# Patient Record
Sex: FEMALE | Race: OTHER | HISPANIC OR LATINO | URBAN - METROPOLITAN AREA
[De-identification: names, ages, dates, MRNs, and addresses within clinical notes are randomized per-mention and may not be internally consistent; named-entity substitution may affect disease eponyms.]

---
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
## 2020-10-01 ENCOUNTER — APPOINTMENT (RX ONLY)
Dept: URBAN - METROPOLITAN AREA CLINIC 48 | Facility: CLINIC | Age: 20
Setting detail: DERMATOLOGY
End: 2020-10-01

## 2020-10-01 DIAGNOSIS — Z41.9 ENCOUNTER FOR PROCEDURE FOR PURPOSES OTHER THAN REMEDYING HEALTH STATE, UNSPECIFIED: ICD-10-CM

## 2020-10-01 PROCEDURE — ? LASER HAIR REMOVAL

## 2020-10-01 NOTE — HPI: COSMETIC (LASER HAIR REMOVAL)
Eye Color: Jarad Fleming
Have You Had Laser Hair Removal Before?: has had previous treatment
When Outside In The Sun, Do You...: mildly burns, tans slowly
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
## 2020-11-19 ENCOUNTER — APPOINTMENT (RX ONLY)
Dept: URBAN - METROPOLITAN AREA CLINIC 48 | Facility: CLINIC | Age: 20
Setting detail: DERMATOLOGY
End: 2020-11-19

## 2020-11-19 DIAGNOSIS — Z41.9 ENCOUNTER FOR PROCEDURE FOR PURPOSES OTHER THAN REMEDYING HEALTH STATE, UNSPECIFIED: ICD-10-CM

## 2020-11-19 PROCEDURE — ? LASER HAIR REMOVAL

## 2020-11-19 NOTE — PROCEDURE: LASER HAIR REMOVAL
Number Of Prepaid Treatments (Will Not Render If 0): 8
Render Post-Care In The Note: No
Laser Type: Desire Diode 805nm
Detail Level: Detailed
Tolerated Procedure (Optional): Tolerated Well
Total Pulses: Lucy
Treatment Number: 0
Fluence (Will Not Render If 0): 3[address] [facility]
Were Eye Shields Employed?: Yes
Comments: 100/15 used above lip
Consent: Written consent obtained, risks reviewed including but not limited to crusting, scabbing, blistering, scarring, darker or lighter pigmentary change, paradoxical hair regrowth, incomplete removal of hair and infection.
Post-Procedure Care: Immediate endpoint: perifollicular erythema and edema. Hydrocortisone cream 2.5% applied. Post care reviewed with patient.
Fluence (Will Not Render If 0): [address] [address]
Fluence (Will Not Render If 0): 20
Pre-Procedure: Prior to proceeding the treatment areas were cleaned and all present put on their eye protection.
Pulse Duration: 100 ms
Shaving (Optional): The patient shaved at home
Post-Care Instructions: I reviewed with the patient in detail post-care instructions. Patient should avoid sun for a minimum of 4 weeks before and after treatment.
Treatment Number: 2
Cooling: chill tip
Location Override: 1/2face
Eye Shield Text: Given the treatment area eye shields were inserted prior to treatment.

## 2020-11-19 NOTE — HPI: COSMETIC (LASER HAIR REMOVAL)
Eye Color: Reginaldo Ocasio
Have You Had Laser Hair Removal Before?: has had previous treatment
When Outside In The Sun, Do You...: mildly burns, tans slowly

## 2021-02-11 ENCOUNTER — APPOINTMENT (RX ONLY)
Dept: URBAN - METROPOLITAN AREA CLINIC 48 | Facility: CLINIC | Age: 21
Setting detail: DERMATOLOGY
End: 2021-02-11

## 2021-02-11 DIAGNOSIS — Z41.9 ENCOUNTER FOR PROCEDURE FOR PURPOSES OTHER THAN REMEDYING HEALTH STATE, UNSPECIFIED: ICD-10-CM

## 2021-02-11 PROCEDURE — ? LASER HAIR REMOVAL

## 2021-02-11 NOTE — PROCEDURE: LASER HAIR REMOVAL
Shaving (Optional): The patient shaved at home
External Cooling Fan Speed: 0
3
Fluence (Will Not Render If 0): 48[address] [facility]
Location Override: 1/2 face
Fluence (Will Not Render If 0): 20
Tolerated Procedure (Optional): Tolerated Well
Detail Level: Detailed
Cooling: chill tip
Total Pulses: 3400 [facility]
Consent: Written consent obtained, risks reviewed including but not limited to crusting, scabbing, blistering, scarring, darker or lighter pigmentary change, paradoxical hair regrowth, incomplete removal of hair and infection.
Laser Type: Desire Diode 805nm
Post-Care Instructions: I reviewed with the patient in detail post-care instructions. Patient should avoid sun for a minimum of 4 weeks before and after treatment.
Were Eye Shields Employed?: Yes
Pre-Procedure: Prior to proceeding the treatment areas were cleaned and all present put on their eye protection.
Render Post-Care In The Note: No
Pulse Duration: 100 ms
Eye Shield Text: Given the treatment area eye protective goggles applied prior to treatment.
Fluence (Will Not Render If 0): [address] [facility]
Post-Procedure Care: Immediate endpoint: perifollicular erythema and edema. Hydrocortisone cream 2.5% applied and removed . Post care reviewed with patient.

## 2021-02-11 NOTE — HPI: COSMETIC (LASER HAIR REMOVAL)
Eye Color: Sherisabelle Yung
Have You Had Laser Hair Removal Before?: has had previous treatment
When Outside In The Sun, Do You...: rarely burns, tans with ease

## 2023-08-24 ENCOUNTER — APPOINTMENT (RX ONLY)
Dept: URBAN - METROPOLITAN AREA CLINIC 48 | Facility: CLINIC | Age: 23
Setting detail: DERMATOLOGY
End: 2023-08-24

## 2023-08-24 DIAGNOSIS — Z41.9 ENCOUNTER FOR PROCEDURE FOR PURPOSES OTHER THAN REMEDYING HEALTH STATE, UNSPECIFIED: ICD-10-CM

## 2023-08-24 PROCEDURE — ? LASER HAIR REMOVAL

## 2023-08-24 NOTE — PROCEDURE: LASER HAIR REMOVAL
Treatment Number: 0
Were Eye Shields Employed?: No
Spot Size: 10 mm
Fluence (Will Not Render If 0): 19
Number Of Prepaid Treatments (Will Not Render If 0): 8
Post-Procedure Care: Immediate endpoint: perifollicular erythema and edema. Hydrocortisone cream applied. Post care reviewed with patient.
Consent: Written consent obtained, risks reviewed including but not limited to crusting, scabbing, blistering, scarring, darker or lighter pigmentary change, paradoxical hair regrowth, incomplete removal of hair and infection.
Cooling: DCD setting
Total Pulses (Settings #3): 548
Laser Type: Lumenis Splendor X
Spot Size: 8 mm
Post-Care Instructions: I reviewed with the patient in detail post-care instructions. Patient should avoid sun for a minimum of 4 weeks before and after treatment.
Eye Shield Text: Given the treatment area eye shields were inserted prior to treatment.
Cooling: chill tip
Fluence (Will Not Render If 0): 26
Spot Size: 12 mm
Total Pulses: 307
Fluence (Will Not Render If 0): 15
Anesthesia Type: 1% lidocaine with epinephrine
Detail Level: Detailed
Pre-Procedure: Prior to proceeding the treatment areas were cleaned and all present put on their eye protection.
Topical Anesthesia Type: 9.6% lidocaine
Fluence (Will Not Render If 0): 20
Treatment Number: 3

## 2023-08-24 NOTE — HPI: COSMETIC (LASER HAIR REMOVAL)
Have You Had Laser Hair Removal Before?: has had previous treatment
When Was Your Last Laser Treatment?: 11/19/2020
Number Of Treatments: 2

## 2023-09-28 ENCOUNTER — APPOINTMENT (RX ONLY)
Dept: URBAN - METROPOLITAN AREA CLINIC 48 | Facility: CLINIC | Age: 23
Setting detail: DERMATOLOGY
End: 2023-09-28

## 2023-09-28 DIAGNOSIS — Z41.9 ENCOUNTER FOR PROCEDURE FOR PURPOSES OTHER THAN REMEDYING HEALTH STATE, UNSPECIFIED: ICD-10-CM

## 2023-09-28 PROCEDURE — ? LASER HAIR REMOVAL

## 2023-09-28 NOTE — HPI: COSMETIC (LASER HAIR REMOVAL)
Have You Had Laser Hair Removal Before?: has had previous treatment
When Was Your Last Laser Treatment?: 08/24/2023
Number Of Treatments: 3

## 2023-09-28 NOTE — PROCEDURE: LASER HAIR REMOVAL
Fluence (Will Not Render If 0): 20
Topical Anesthesia Type: 9.6% lidocaine
Pre-Procedure: Prior to proceeding the treatment areas were cleaned and all present put on their eye protection.
Treatment Number: 0
Treatment Number: 4
Spot Size: 10 mm
Anesthesia Type: 1% lidocaine with epinephrine
Eye Shield Text: Given the treatment area eye shields were inserted prior to treatment.
Render Post-Care In The Note: No
Cooling: chill tip
Spot Size: 8 mm
Cooling: DCD setting
Fluence (Will Not Render If 0): 19
Post-Procedure Care: Immediate endpoint: perifollicular erythema and edema. Hydrocortisone cream applied. Post care reviewed with patient.
Number Of Prepaid Treatments (Will Not Render If 0): 8
Fluence (Will Not Render If 0): 26
Total Pulses (Settings #3): 548
Consent: Written consent obtained, risks reviewed including but not limited to crusting, scabbing, blistering, scarring, darker or lighter pigmentary change, paradoxical hair regrowth, incomplete removal of hair and infection.
Laser Type: Lumenis Splendor X
Fluence (Will Not Render If 0): 15
Spot Size: 12 mm
Post-Care Instructions: I reviewed with the patient in detail post-care instructions. Patient should avoid sun for a minimum of 4 weeks before and after treatment.
Total Pulses: 398
Detail Level: Detailed

## 2023-12-29 ENCOUNTER — APPOINTMENT (RX ONLY)
Dept: URBAN - METROPOLITAN AREA CLINIC 47 | Facility: CLINIC | Age: 23
Setting detail: DERMATOLOGY
End: 2023-12-29

## 2023-12-29 DIAGNOSIS — Z41.9 ENCOUNTER FOR PROCEDURE FOR PURPOSES OTHER THAN REMEDYING HEALTH STATE, UNSPECIFIED: ICD-10-CM

## 2023-12-29 PROCEDURE — ? LASER HAIR REMOVAL

## 2024-03-22 ENCOUNTER — APPOINTMENT (RX ONLY)
Dept: URBAN - METROPOLITAN AREA CLINIC 47 | Facility: CLINIC | Age: 24
Setting detail: DERMATOLOGY
End: 2024-03-22